# Patient Record
Sex: FEMALE | Race: WHITE
[De-identification: names, ages, dates, MRNs, and addresses within clinical notes are randomized per-mention and may not be internally consistent; named-entity substitution may affect disease eponyms.]

---

## 2017-07-14 ENCOUNTER — HOSPITAL ENCOUNTER (EMERGENCY)
Dept: HOSPITAL 45 - C.EDB | Age: 82
Discharge: HOME | End: 2017-07-14
Payer: COMMERCIAL

## 2017-07-14 VITALS
HEIGHT: 63.5 IN | BODY MASS INDEX: 18.87 KG/M2 | BODY MASS INDEX: 18.87 KG/M2 | WEIGHT: 107.81 LBS | WEIGHT: 107.81 LBS | HEIGHT: 63.5 IN

## 2017-07-14 VITALS
DIASTOLIC BLOOD PRESSURE: 78 MMHG | HEART RATE: 77 BPM | SYSTOLIC BLOOD PRESSURE: 155 MMHG | OXYGEN SATURATION: 97 % | TEMPERATURE: 97.88 F

## 2017-07-14 DIAGNOSIS — M25.512: Primary | ICD-10-CM

## 2017-07-14 DIAGNOSIS — I10: ICD-10-CM

## 2017-07-14 DIAGNOSIS — M25.522: ICD-10-CM

## 2017-07-14 DIAGNOSIS — Z79.899: ICD-10-CM

## 2017-07-14 LAB
ALBUMIN/GLOB SERPL: 1.3 {RATIO} (ref 0.9–2)
ALP SERPL-CCNC: 81 U/L (ref 45–117)
ALT SERPL-CCNC: 33 U/L (ref 12–78)
ANION GAP SERPL CALC-SCNC: 8 MMOL/L (ref 3–11)
AST SERPL-CCNC: 20 U/L (ref 15–37)
BUN SERPL-MCNC: 17 MG/DL (ref 7–18)
BUN/CREAT SERPL: 22.6 (ref 10–20)
CALCIUM SERPL-MCNC: 9.6 MG/DL (ref 8.5–10.1)
CHLORIDE SERPL-SCNC: 97 MMOL/L (ref 98–107)
CKMB/CK RATIO: 1.6 (ref 0–3)
CO2 SERPL-SCNC: 32 MMOL/L (ref 21–32)
CREAT CL PREDICTED SERPL C-G-VRATE: 43.3 ML/MIN
CREAT SERPL-MCNC: 0.76 MG/DL (ref 0.6–1.2)
EOSINOPHIL NFR BLD AUTO: 242 K/UL (ref 130–400)
GLOBULIN SER-MCNC: 3.1 GM/DL (ref 2.5–4)
GLUCOSE SERPL-MCNC: 94 MG/DL (ref 70–99)
HCT VFR BLD CALC: 43.3 % (ref 37–47)
INR PPP: 1 (ref 0.9–1.1)
MCH RBC QN AUTO: 29 PG (ref 25–34)
MCHC RBC AUTO-ENTMCNC: 33.3 G/DL (ref 32–36)
MCV RBC AUTO: 87.3 FL (ref 80–100)
PARTIAL THROMBOPLASTIN RATIO: 1
PMV BLD AUTO: 9.5 FL (ref 7.4–10.4)
POTASSIUM SERPL-SCNC: 3.9 MMOL/L (ref 3.5–5.1)
PROTHROMBIN TIME: 10.7 SECONDS (ref 9–12)
RBC # BLD AUTO: 4.96 M/UL (ref 4.2–5.4)
SODIUM SERPL-SCNC: 137 MMOL/L (ref 136–145)
WBC # BLD AUTO: 6.19 K/UL (ref 4.8–10.8)

## 2017-07-14 NOTE — DIAGNOSTIC IMAGING REPORT
CHEST ONE VIEW PORTABLE



CLINICAL HISTORY: 83 years-old Female presenting with chest pain with high blood

pressure, pain in left shoulder and arm. 



TECHNIQUE: Portable upright AP view of the chest was obtained.



COMPARISON:  None.



FINDINGS:

Cardiomediastinal silhouette normal. Lungs and pleural spaces clear. Osseous

structures and upper abdomen normal.



IMPRESSION:

1.  No acute cardiopulmonary disease.







Electronically signed by:  Bryson Abbott M.D.

7/14/2017 1:13 PM



Dictated Date/Time:  7/14/2017 1:12 PM

## 2017-07-14 NOTE — DIAGNOSTIC IMAGING REPORT
LEFT SHOULDER MIN 2 VIEWS ROUTINE



CLINICAL HISTORY: Left shoulder pain.    



COMPARISON: None.



FINDINGS:  Alignment of left shoulder is anatomic. There is no fracture. There

is moderate arthritis of the left acromioclavicular joint. There is minimal

arthritis of the glenohumeral joint.



IMPRESSION: 



1. No acute fracture or dislocation of the left shoulder.



2. Moderate osteoarthritis of the left acromioclavicular joint.







Electronically signed by:  Grant Call M.D.

7/14/2017 2:15 PM



Dictated Date/Time:  7/14/2017 2:14 PM

## 2017-07-17 NOTE — EMERGENCY ROOM VISIT NOTE
ED Visit Note


First contact with patient:  13:15


Chief Complaint: High blood pressure and left shoulder and upper arm pain.





History of Present Illness: Ms. Tavares is a 83 year-old white female who 

ambulates into the ED accompanied by her  complaining of hypertension 

and left shoulder/upper arm pain.


Historically patient reports hypertension.


Patient and  reports approximately 2 weeks ago she was seen by her PCP 

and found to be hypertensive.  No change in her medications or done at that 

time and a 3 week follow-up visit was scheduled.


Patient reports for the last 3-4 days she has been having left shoulder and 

upper arm pain.  She describes this as a deep achy sensation.  She rates her 

discomfort 5/10.  The pain is nonradiating.  She does feel like the pain is 

getting worse over the last 3-4 days.  Her pain worsens with palpation and all 

movements of the shoulder excluding rotational movements.  She has not 

identified any alleviating factors related to the pain.  She has not taken any 

medications for pain prior to arrival at the hospital.


She denies any associated precipitating injury; previous, acute or repetitive, 

fevers, chills, sweats, skin eruptions, skin color changes, headache, dizziness

, lightheadedness, upper respiratory tract symptoms, cough, wheezing, shortness 

of breath, chest pain, palpitations, orthopnea, dependent edema, previous clots

, claudication, cramping, recent surgery/inactivity/extended travel, abdominal 

pain, nausea, vomiting, neck pain, back pain, upper extremity weakness/numbness/

tingling.  





Review of Systems: As noted above in history of present illness. All body 

systems were reviewed and found to be negative as noted above.





Past Medical History: As previously noted, osteoarthritis.


Current Medications:








 Medications  Dose


 Route/Sig


 Max Daily Dose Days Date Category Dose


Instructions


 


 Lexapro


  (Escitalopram


 Oxalate) 10 Mg Tab  10 Mg


 PO HS


    7/14/17 Reported 


 


 Centrum Silver


 Adult 50+


  (Multiple


 Vitamins W/


 Minerals) 1 Tab


 Tab  


 PO DAILY


    6/16/14 Reported 


 


 Celebrex


  (Celecoxib) 100


 Mg Cap  100 Mg


 PO QAM


    6/16/14 Reported 


 


 Ambien (Zolpidem


 Tartrate) 5 Mg Tab  5 Mg


 PO HS


    6/16/14 Reported 


 


 Oscal 500/200 D-3


  (Calcium


 Carbonate-Vitamin


 D) 1 Tab Tab  500 Mg


 PO DAILY


    6/16/14 Reported 


 


 Stool Softener


  (Docusate Sodium)


 100 Mg Cap  100 Mg


 PO DAILY


    6/16/14 Reported 


 


 Tylenol


  (Acetaminophen)


 500 Mg Tab  500 Mg


 PO Q4HRS PRN


    6/16/14 Reported 








Allergies to Medications: Patient denies.


Social History: Patient is currently retired; she lives the  and feels 

safe in her home environment; she denies tobacco and alcohol use.





Physical Examination:


Vital Signs: 








  Date Time  Temp Pulse Resp B/P (MAP) Pulse Ox O2 Delivery O2 Flow Rate FiO2


 


7/14/17 15:18 36.6 77 18 155/78 97   


 


7/14/17 15:17  77 18 155/78 97 Room Air  


 


7/14/17 14:40  75 18 162/81 99 Room Air  


 


7/14/17 13:16  68      


 


7/14/17 12:50     99 Room Air  


 


7/14/17 12:23  69      


 


7/14/17 12:07 36.6 97 16 123/64 95 Room Air  





GENERAL: 83-year-old female in mild distress due to pain, nontoxic-appearing, 

afebrile and hemodynamically stable.


NEUROLOGICAL: Awake, alert and oriented to person, place and time.  Answering 

questions appropriately and following commands.  Normal gait.  Good hand eye 

coordination.  


SKIN: Warm, dry and pink.  No soft tissue eruptions or trauma noted.


HEENT:  Atraumatic and normocephalic.  PERRLA.  Sclera white and conjunctiva 

pink.  Oral cavity moist and pink.  Pharynx is nonerythematous or edematous.  

Speech normal.  No lymphadenopathy.  Trachea midline.  No jugular venous 

distention.  No carotid bruits.


BACK:  No tenderness over the bony cervical and thoracic spine.  No tenderness 

within the paraspinous muscle group said no palpable spasm.  No CVA tenderness.

  


THORAX:  Lungs sounds are clear to auscultation and equal bilaterally with 

symmetrical chest wall.  No wheezing, rales or rhonchi.  No crepitus, tenderness

, subcutaneous air or deformities noted.


HEART:  Regular rate and rhythm.  No gallops, rubs or murmurs are appreciated.  

No lifts, heaves or thrills.  PMI is not displaced.


ABDOMEN: Flat, soft and nontender.  Positive bowel sounds in all quadrants.  No 

guarding, rigidity or organomegaly.


EXTREMITIES:  Moves all extremities well on command and with purpose.  All 

distal neurovascular statuses are intact and equal bilaterally.  LEFT UPPER 

EXTREMITY: No gross bony deformity.  Mild tenderness around the humeral head 

predominately over the anterior and lateral aspect.  I do not appreciate any 

palpable crepitus, swelling or erythema.  No tenderness through the distal 

portion of the humerus, elbow, forearm or wrist.  Distal pulses and sensations 

are intact.  No dependent edema or calf tenderness/cords.





ED Course:


Patient is assessed as noted above.  


Patient's medication list was reviewed.


Laboratory Testing:








Test


  7/14/17


12:45 7/14/17


12:51 Range/Units


 


 


White Blood Count 6.19  4.8-10.8  K/uL


 


Red Blood Count 4.96  4.2-5.4  M/uL


 


Hemoglobin 14.4  12.0-16.0  g/dL


 


Hematocrit 43.3  37-47  %


 


Mean Corpuscular Volume 87.3    fL


 


Mean Corpuscular Hemoglobin 29.0  25-34  pg


 


Mean Corpuscular Hemoglobin


Concent 33.3


  


  32-36  g/dl


 


 


RDW Standard Deviation 38.7  36.4-46.3  fL


 


RDW Coefficient of Variation 12.1  11.5-14.5  %


 


Platelet Count 242  130-400  K/uL


 


Mean Platelet Volume 9.5  7.4-10.4  fL


 


Prothrombin Time


  10.7


  


  9.0-12.0


SECONDS


 


Prothromb Time International


Ratio 1.0


  


  0.9-1.1  


 


 


Activated Partial


Thromboplast Time 26.1


  


  21.0-31.0


SECONDS


 


Partial Thromboplastin Ratio 1.0   


 


Sodium Level 137  136-145  mmol/L


 


Potassium Level 3.9  3.5-5.1  mmol/L


 


Chloride Level 97    mmol/L


 


Carbon Dioxide Level 32  21-32  mmol/L


 


Anion Gap 8.0  3-11  mmol/L


 


Blood Urea Nitrogen 17  7-18  mg/dl


 


Creatinine


  0.76


  


  0.60-1.20


mg/dl


 


Est Creatinine Clear Calc


Drug Dose 43.3


  


   ml/min


 


 


Estimated GFR (


American) 84.1


  


   


 


 


Estimated GFR (Non-


American 72.5


  


   


 


 


BUN/Creatinine Ratio 22.6  10-20  


 


Random Glucose 94  70-99  mg/dl


 


Calcium Level 9.6  8.5-10.1  mg/dl


 


Total Bilirubin 1.0  0.2-1  mg/dl


 


Aspartate Amino Transf


(AST/SGOT) 20


  


  15-37  U/L


 


 


Alanine Aminotransferase


(ALT/SGPT) 33


  


  12-78  U/L


 


 


Alkaline Phosphatase 81    U/L


 


Total Creatine Kinase 81    U/L


 


Creatine Kinase MB 1.3  0.5-3.6  ng/ml


 


Creatine Kinase MB Ratio 1.6  0-3.0  


 


Total Protein 7.2  6.4-8.2  gm/dl


 


Albumin 4.1  3.4-5.0  gm/dl


 


Globulin 3.1  2.5-4.0  gm/dl


 


Albumin/Globulin Ratio 1.3  0.9-2  


 


Bedside Troponin I  < 0.030 0-0.045  ng/ml





Chest X-Ray: Was read by myself and the radiologist showing no acute infiltrates

, effusions or pneumothorax.  No vascular changes.  Normal heart silhouette and 

bony anatomy.


Left Shoulder X-Ray: Was read by myself and the radiologist showing no acute 

fractures or dislocations.  Moderate arthritis in the left acromioclavicular 

joint and minimal arthritis of the glenoid humeral joint.


Patient was reassessed multiple times during her stay in the emergency 

department.


Patient's case was reviewed with Dr. Saenz; he independently assessed the 

patient we agreed on diagnostic approach, treatment, disposition and plan.


Patient  were educated about today's findings and instructed on her 

treatment plan; they verbalized understanding and agreement with this plan.





Clinical Impression: Left shoulder pain.  Left upper arm and elbow pain.





Decision-Making: Initially my differential diagnosis I considered acute 

coronary syndrome, thoracic aneurysm, pneumothorax, pneumonia, pulmonary 

embolism, elbow fracture, acromioclavicular joint separation, arthritis 

exacerbation and other causes.  





Disposition: Patient discharged home in stable condition accompanied by her 

; prior to departure she was reassessed and subjectively reported she 

was feeling better but continued to rate her discomfort 6/10.





Plan:


Patient was encouraged to continue her current medications as prescribed.


Patient was prescribed Ultram 50 mg every 6 hours as needed for pain.


Patient was encouraged use heat or ice on her shoulder as needed.


Patient was encouraged to follow-up with her PCP for recheck in 2-3 days.


Patient was encouraged return ED for worsening pain, chest pain, shortness of 

breath, fevers, vomiting, left upper extremity weakness/numbness/tingling or 

any new/concerning symptoms.

## 2017-12-05 ENCOUNTER — HOSPITAL ENCOUNTER (OUTPATIENT)
Dept: HOSPITAL 45 - C.LAB1850 | Age: 82
Discharge: HOME | End: 2017-12-05
Attending: INTERNAL MEDICINE
Payer: COMMERCIAL

## 2017-12-05 DIAGNOSIS — R03.0: Primary | ICD-10-CM

## 2017-12-05 LAB
ALBUMIN/GLOB SERPL: 1.1 {RATIO} (ref 0.9–2)
ALP SERPL-CCNC: 90 U/L (ref 45–117)
ALT SERPL-CCNC: 44 U/L (ref 12–78)
ANION GAP SERPL CALC-SCNC: 2 MMOL/L (ref 3–11)
AST SERPL-CCNC: 24 U/L (ref 15–37)
BASOPHILS # BLD: 0.03 K/UL (ref 0–0.2)
BASOPHILS NFR BLD: 0.5 %
BUN SERPL-MCNC: 22 MG/DL (ref 7–18)
BUN/CREAT SERPL: 32.4 (ref 10–20)
CALCIUM SERPL-MCNC: 9.1 MG/DL (ref 8.5–10.1)
CHLORIDE SERPL-SCNC: 98 MMOL/L (ref 98–107)
CO2 SERPL-SCNC: 34 MMOL/L (ref 21–32)
COMPLETE: YES
CREAT SERPL-MCNC: 0.67 MG/DL (ref 0.6–1.2)
EOSINOPHIL NFR BLD AUTO: 214 K/UL (ref 130–400)
GLOBULIN SER-MCNC: 3.5 GM/DL (ref 2.5–4)
GLUCOSE SERPL-MCNC: 81 MG/DL (ref 70–99)
HCT VFR BLD CALC: 41.6 % (ref 37–47)
IG%: 0.2 %
IMM GRANULOCYTES NFR BLD AUTO: 21.3 %
LYMPHOCYTES # BLD: 1.34 K/UL (ref 1.2–3.4)
MCH RBC QN AUTO: 30.8 PG (ref 25–34)
MCHC RBC AUTO-ENTMCNC: 33.7 G/DL (ref 32–36)
MCV RBC AUTO: 91.6 FL (ref 80–100)
MONOCYTES NFR BLD: 11.3 %
NEUTROPHILS # BLD AUTO: 2.5 %
NEUTROPHILS NFR BLD AUTO: 64.2 %
PMV BLD AUTO: 10.3 FL (ref 7.4–10.4)
POTASSIUM SERPL-SCNC: 3.9 MMOL/L (ref 3.5–5.1)
RBC # BLD AUTO: 4.54 M/UL (ref 4.2–5.4)
SODIUM SERPL-SCNC: 134 MMOL/L (ref 136–145)
WBC # BLD AUTO: 6.28 K/UL (ref 4.8–10.8)

## 2018-03-09 ENCOUNTER — HOSPITAL ENCOUNTER (OUTPATIENT)
Dept: HOSPITAL 45 - C.EDB | Age: 83
Setting detail: OBSERVATION
LOS: 1 days | Discharge: HOME | End: 2018-03-10
Attending: HOSPITALIST | Admitting: STUDENT IN AN ORGANIZED HEALTH CARE EDUCATION/TRAINING PROGRAM
Payer: COMMERCIAL

## 2018-03-09 VITALS
OXYGEN SATURATION: 94 % | DIASTOLIC BLOOD PRESSURE: 81 MMHG | SYSTOLIC BLOOD PRESSURE: 181 MMHG | HEART RATE: 73 BPM | TEMPERATURE: 97.88 F

## 2018-03-09 VITALS
SYSTOLIC BLOOD PRESSURE: 156 MMHG | HEART RATE: 73 BPM | OXYGEN SATURATION: 100 % | TEMPERATURE: 97.88 F | DIASTOLIC BLOOD PRESSURE: 76 MMHG

## 2018-03-09 VITALS — HEART RATE: 75 BPM | DIASTOLIC BLOOD PRESSURE: 84 MMHG | SYSTOLIC BLOOD PRESSURE: 177 MMHG

## 2018-03-09 VITALS — SYSTOLIC BLOOD PRESSURE: 170 MMHG | DIASTOLIC BLOOD PRESSURE: 98 MMHG | HEART RATE: 71 BPM | TEMPERATURE: 97.88 F

## 2018-03-09 VITALS
TEMPERATURE: 98.24 F | SYSTOLIC BLOOD PRESSURE: 167 MMHG | HEART RATE: 75 BPM | DIASTOLIC BLOOD PRESSURE: 82 MMHG | OXYGEN SATURATION: 100 %

## 2018-03-09 VITALS — OXYGEN SATURATION: 97 %

## 2018-03-09 VITALS
WEIGHT: 108.91 LBS | BODY MASS INDEX: 19.3 KG/M2 | HEIGHT: 63 IN | WEIGHT: 108.91 LBS | HEIGHT: 63 IN | BODY MASS INDEX: 19.3 KG/M2

## 2018-03-09 DIAGNOSIS — G93.41: ICD-10-CM

## 2018-03-09 DIAGNOSIS — F32.9: ICD-10-CM

## 2018-03-09 DIAGNOSIS — N13.30: ICD-10-CM

## 2018-03-09 DIAGNOSIS — N39.0: Primary | ICD-10-CM

## 2018-03-09 DIAGNOSIS — E86.0: ICD-10-CM

## 2018-03-09 DIAGNOSIS — B96.20: ICD-10-CM

## 2018-03-09 DIAGNOSIS — E87.6: ICD-10-CM

## 2018-03-09 LAB
ALBUMIN SERPL-MCNC: 3.7 GM/DL (ref 3.4–5)
ALP SERPL-CCNC: 103 U/L (ref 45–117)
ALT SERPL-CCNC: 41 U/L (ref 12–78)
AST SERPL-CCNC: 24 U/L (ref 15–37)
BASOPHILS # BLD: 0.02 K/UL (ref 0–0.2)
BASOPHILS NFR BLD: 0.4 %
BUN SERPL-MCNC: 35 MG/DL (ref 7–18)
CALCIUM SERPL-MCNC: 8.5 MG/DL (ref 8.5–10.1)
CO2 SERPL-SCNC: 32 MMOL/L (ref 21–32)
CREAT SERPL-MCNC: 0.78 MG/DL (ref 0.6–1.2)
EOS ABS #: 0.17 K/UL (ref 0–0.5)
EOSINOPHIL NFR BLD AUTO: 205 K/UL (ref 130–400)
GLUCOSE SERPL-MCNC: 93 MG/DL (ref 70–99)
HCT VFR BLD CALC: 40.3 % (ref 37–47)
HGB BLD-MCNC: 13.6 G/DL (ref 12–16)
IG#: 0.01 K/UL (ref 0–0.02)
IMM GRANULOCYTES NFR BLD AUTO: 32 %
INR PPP: 1 (ref 0.9–1.1)
LYMPHOCYTES # BLD: 1.8 K/UL (ref 1.2–3.4)
MCH RBC QN AUTO: 30.2 PG (ref 25–34)
MCHC RBC AUTO-ENTMCNC: 33.7 G/DL (ref 32–36)
MCV RBC AUTO: 89.6 FL (ref 80–100)
MONO ABS #: 0.69 K/UL (ref 0.11–0.59)
MONOCYTES NFR BLD: 12.3 %
NEUT ABS #: 2.93 K/UL (ref 1.4–6.5)
NEUTROPHILS # BLD AUTO: 3 %
NEUTROPHILS NFR BLD AUTO: 52.1 %
PMV BLD AUTO: 10 FL (ref 7.4–10.4)
POTASSIUM SERPL-SCNC: 3.3 MMOL/L (ref 3.5–5.1)
PROT SERPL-MCNC: 7.2 GM/DL (ref 6.4–8.2)
PTT PATIENT: 23.7 SECONDS (ref 21–31)
RED CELL DISTRIBUTION WIDTH CV: 12.6 % (ref 11.5–14.5)
RED CELL DISTRIBUTION WIDTH SD: 41.1 FL (ref 36.4–46.3)
SODIUM SERPL-SCNC: 143 MMOL/L (ref 136–145)
WBC # BLD AUTO: 5.62 K/UL (ref 4.8–10.8)

## 2018-03-09 RX ADMIN — SODIUM CHLORIDE AND POTASSIUM CHLORIDE SCH MLS/HR: 9; 1.49 INJECTION, SOLUTION INTRAVENOUS at 07:44

## 2018-03-09 RX ADMIN — Medication SCH TAB: at 07:44

## 2018-03-09 NOTE — EMERGENCY ROOM VISIT NOTE
History


Report prepared by John:  Camron Copeland


Under the Supervision of:  Dr. Darian Ye M.D.


First contact with patient:  02:38


Chief Complaint:  ALTERED MENTAL STATUS


Stated Complaint:  ALTERED MENTAL STATUS





History of Present Illness


The patient is a 84 year old female who presents to the Emergency Room with 

complaints of altered mental status that began prior to arrival. Per EMS, [facility] 

[facility] was knocking on her neighbor's door with an 

unopened [place] of [facility]. She states that the current year is 2001

, the month is December, and that the president is Jay. She denies blood 

thinner use, chest pain, and headache.





   Source of History:  patient, EMS


   Onset:  PTA


   Symptom Intensity:  pain rated as 0/10


   Quality:  other (altered mental status)


   Timing:  constant


   Associated Symptoms:  No headache, No chest pain





Review of Systems


See HPI for pertinent positives & negatives. A total of 10 systems reviewed and 

were otherwise negative.





Past Medical & Surgical


Medical Problems:


(1) Urinary tract infection








Social History


Smoking Status:  Never Smoker


Marital Status:  


Occupation Status:  retired





Current/Historical Medications


Scheduled


Calcium Carbonate-Vitamin D (Oscal 500/200 D-3), 500 MG PO DAILY


Celecoxib (Celebrex), 100 MG PO QAM


Escitalopram (Lexapro), 10 MG PO HS


Multiple Vitamins W/ Minerals (Centrum Silver Adult 50+), PO DAILY


Zolpidem Tartrate (Ambien), 5 MG PO HS





Scheduled PRN


Tramadol (Ultram), 50 MG PO Q8H PRN for Pain





Allergies


Coded Allergies:  


     No Known Allergies (Unverified , 7/14/17)





Physical Exam


Vital Signs











  Date Time  Temp Pulse Resp B/P (MAP) Pulse Ox O2 Delivery O2 Flow Rate FiO2


 


3/9/18 04:31  74 20 131/62 97 Room Air  


 


3/9/18 02:37 36.7 85 22 183/67 95 Room Air  











Physical Exam


GENERAL: Patient is pleasantly confused and in no acute distress.


EYES: No scleral icterus, unremarkable pupils.


ENT: Mucous membranes moist, no nasal congestion.


NECK: No masses appreciated, no meningismus, trachea is midline.


RESPIRATORY: No dyspnea. Clear to auscultation and equal bilaterally. No wheeze

, no rhonchi.


CARDIOVASCULAR: Regular rate and rhythm.  No murmurs, rubs, gallops appreciated.


GASTROINTESTINAL: Abdomen soft, nontender, no peritonitis.  Bowel sounds 

positive.  No masses appreciated.


BACK: No midline tenderness, no CVA tenderness


EXTREMITIES: Normal motion all extremities, no cyanosis, no edema.


NEUROLOGIC: Not alert and oriented, no acute motor or sensory deficits, no 

focal weakness, cranial nerves grossly intact. She does not know the date or 

location.


SKIN: No rash, no jaundice, no diaphoresis.





Medical Decision & Procedures


ER Provider


Diagnostic Interpretation:


Radiology results and stated below per my review and radiologist interpretation:





X ray results are stated below per my interpretation:


Chest: 1 view: No infiltrate, no effusion, normal cardiac border. 





CT HEAD:





No acute findings.





Age related cerebral atrophy and senescent white matter changes.





Basal ganglia calcifications.





Intracranial atherosclerosis.





Radiologist: Parrish Biggs MD   Phone: [phone]


Study read at 03:07 and initial results transmitted at 03:10.





Laboratory Results


3/9/18 02:43








Red Blood Count 4.50, Mean Corpuscular Volume 89.6, Mean Corpuscular Hemoglobin 

30.2, Mean Corpuscular Hemoglobin Concent 33.7, Mean Platelet Volume 10.0, 

Neutrophils (%) (Auto) 52.1, Lymphocytes (%) (Auto) 32.0, Monocytes (%) (Auto) 

12.3, Eosinophils (%) (Auto) 3.0, Basophils (%) (Auto) 0.4, Neutrophils # (Auto

) 2.93, Lymphocytes # (Auto) 1.80, Monocytes # (Auto) 0.69, Eosinophils # (Auto

) 0.17, Basophils # (Auto) 0.02





3/9/18 02:43

















Test


  3/9/18


02:43 3/9/18


02:45 3/9/18


03:28


 


White Blood Count


  5.62 K/uL


(4.8-10.8) 


  


 


 


Red Blood Count


  4.50 M/uL


(4.2-5.4) 


  


 


 


Hemoglobin


  13.6 g/dL


(12.0-16.0) 


  


 


 


Hematocrit 40.3 % (37-47)   


 


Mean Corpuscular Volume


  89.6 fL


() 


  


 


 


Mean Corpuscular Hemoglobin


  30.2 pg


(25-34) 


  


 


 


Mean Corpuscular Hemoglobin


Concent 33.7 g/dl


(32-36) 


  


 


 


Platelet Count


  205 K/uL


(130-400) 


  


 


 


Mean Platelet Volume


  10.0 fL


(7.4-10.4) 


  


 


 


Neutrophils (%) (Auto) 52.1 %   


 


Lymphocytes (%) (Auto) 32.0 %   


 


Monocytes (%) (Auto) 12.3 %   


 


Eosinophils (%) (Auto) 3.0 %   


 


Basophils (%) (Auto) 0.4 %   


 


Neutrophils # (Auto)


  2.93 K/uL


(1.4-6.5) 


  


 


 


Lymphocytes # (Auto)


  1.80 K/uL


(1.2-3.4) 


  


 


 


Monocytes # (Auto)


  0.69 K/uL


(0.11-0.59) 


  


 


 


Eosinophils # (Auto)


  0.17 K/uL


(0-0.5) 


  


 


 


Basophils # (Auto)


  0.02 K/uL


(0-0.2) 


  


 


 


RDW Standard Deviation


  41.1 fL


(36.4-46.3) 


  


 


 


RDW Coefficient of Variation


  12.6 %


(11.5-14.5) 


  


 


 


Immature Granulocyte % (Auto) 0.2 %   


 


Immature Granulocyte # (Auto)


  0.01 K/uL


(0.00-0.02) 


  


 


 


Prothrombin Time


  10.2 SECONDS


(9.0-12.0) 


  


 


 


Prothromb Time International


Ratio 1.0 (0.9-1.1) 


  


  


 


 


Activated Partial


Thromboplast Time 23.7 SECONDS


(21.0-31.0) 


  


 


 


Partial Thromboplastin Ratio 0.9   


 


Anion Gap


  6.0 mmol/L


(3-11) 


  


 


 


Est Creatinine Clear Calc


Drug Dose 41.9 ml/min 


  


  


 


 


Estimated GFR (


American) 80.9 


  


  


 


 


Estimated GFR (Non-


American 69.8 


  


  


 


 


BUN/Creatinine Ratio 45.5 (10-20)   


 


Calcium Level


  8.5 mg/dl


(8.5-10.1) 


  


 


 


Total Bilirubin


  0.3 mg/dl


(0.2-1) 


  


 


 


Direct Bilirubin


  0.1 mg/dl


(0-0.2) 


  


 


 


Aspartate Amino Transf


(AST/SGOT) 24 U/L (15-37) 


  


  


 


 


Alanine Aminotransferase


(ALT/SGPT) 41 U/L (12-78) 


  


  


 


 


Alkaline Phosphatase


  103 U/L


() 


  


 


 


Troponin I


  < 0.015 ng/ml


(0-0.045) 


  


 


 


Total Protein


  7.2 gm/dl


(6.4-8.2) 


  


 


 


Albumin


  3.7 gm/dl


(3.4-5.0) 


  


 


 


Urine Color  YELLOW  


 


Urine Appearance  CLEAR (CLEAR)  


 


Urine pH  7.0 (4.5-7.5)  


 


Urine Specific Gravity


  


  1.019


(1.000-1.030) 


 


 


Urine Protein  NEG (NEG)  


 


Urine Glucose (UA)  NEG (NEG)  


 


Urine Ketones  NEG (NEG)  


 


Urine Occult Blood  TRACE (NEG)  


 


Urine Nitrite  POS (NEG)  


 


Urine Bilirubin  NEG (NEG)  


 


Urine Urobilinogen  NEG (NEG)  


 


Urine Leukocyte Esterase  LARGE (NEG)  


 


Urine WBC (Auto)  >30 /hpf (0-5)  


 


Urine RBC (Auto)  0-4 /hpf (0-4)  


 


Urine Hyaline Casts (Auto)


  


  5-10 /lpf


(0-5) 


 


 


Urine Epithelial Cells (Auto)  0-5 /lpf (0-5)  


 


Urine Bacteria (Auto)  4+ (NEG)  


 


Bedside Glucose


  


  132 mg/dl


(70-90) 


 


 


Urine Opiates Screen  NEG (NEG)  


 


Urine Methadone, Qualitative  NEG (NEG)  


 


Urine Barbiturates  NEG (NEG)  


 


Urine Phencyclidine (PCP)


Level 


  NEG (NEG) 


  


 


 


Ur


Amphetamine/Methamphetamine 


  NEG (NEG) 


  


 


 


MDMA (Ecstasy) Screen  NEG (NEG)  


 


Urine Benzodiazepines Screen  NEG (NEG)  


 


Urine Cocaine Metabolite  NEG (NEG)  


 


Urine Marijuana (THC)  NEG (NEG)  


 


Ethyl Alcohol mg/dL


  


  


  < 3.0 mg/dl


(0-3)





Laboratory results as reviewed by me.





Medications Administered











 Medications


  (Trade)  Dose


 Ordered  Sig/Janet


 Route  Start Time


 Stop Time Status Last Admin


Dose Admin


 


 Ceftriaxone Sodium


  (Rocephin Inj)  1 gm  NOW  STAT


 IV  3/9/18 04:06


 3/9/18 04:07 DC 3/9/18 04:25


1 GM











ECG Per My Interpretation


Indication:  altered mental status


Rate (beats per minute):  81


Rhythm:  normal sinus


Findings:  no acute ischemic change, no ectopy, other (QTc at 443)





ED Course


0238: The patient was evaluated in room A10. A complete history and physical 

exam was performed.





0302: I checked on the patient and her  is here. He notes that she was 

normal when they went to bed at 2300 tonight. The live together in a house. She 

has had no falls or recent injuries. This is an acute change for her. She is 

normally very very with it. She would normally note where and when she is. 





0407: I checked on the patient and she is still confused. She wants to go home 

but her  disagrees and should not.





0430: Upon reevaluation, the patient is doing well. Discussed results and 

treatment plan with the patient.  The patient and her  verbalized 

understanding and agreement with the treatment plan. The patient will be 

evaluated for further management. 





0457: I called the patient's son and let him know about his mother.





0459: I had an extensive conversation with the patient's son, Dre and updated 

him on the patient's management plan.





Medical Decision


Differential: Toxicological, Infectious, Stroke, SAH, Trauma, Electrolyte 

Abnormality, Hypoglycemia, Alcohol Intoxication, Drug Intoxication, Cardiac 

Abnormality, Sepsis, Meningitis/Encephalitis, Trauma, Excited Delirium, 

Serotonin Syndrome, Psychiatric, amongst other pathologies entertained.





84 yr old female brought in after she was found wandering street with unopened 

bottle of wine.  Brought in by EMS for further evaluation.   agrees she 

is more confused than usual and son notes multiple episodes last few months of 

confusion that seem to resolve spontaneously.  CT head, cxr, labs unremarkable.

  UA clearly consistent with UTI.  She is a danger of being harmed if she goes 

home given confusion and possibilities of fall, being hit by car, etc.  Given 

IV rocephin for treatment of UTI.  Hospitalist in to evaluate further.





Medication Reconcilliation


Current Medication List:  was personally reviewed by me





Blood Pressure Screening


Patient's blood pressure:  Elevated blood pressure


Blood pressure disposition:  Elevated BP felt to be situational





Impression





 Primary Impression:  


 UTI (urinary tract infection)


 Additional Impressions:  


 Confusion


 Altered mental status





Scribe Attestation


The scribe's documentation has been prepared under my direction and personally 

reviewed by me in its entirety. I confirm that the note above accurately 

reflects all work, treatment, procedures, and medical decision making performed 

by me.





Departure Information


Referrals


Wan Grubbs M.D. (PCP)





Patient Instructions


My [facility]





Problem Qualifiers

## 2018-03-09 NOTE — PROGRESS NOTE
Progress Note


Date of Service


Mar 9, 2018.


 (Tracy Parra MD)





Progress Note


S: Pt admitted overnight


Feels well currently but has some lower abd / back pain on R





O:


VS - per EMR


Gen: Awake, alert, NAD


CVS: RRR, HS normal no MRG


Chest: CTAB


Abd: SNT, no suprapubic tenderness, no CVA tenderness


Ext: No peripheral edema





A/P:


R hydronephrosis in setting of UTI - concerning if she has a stone, as could 

decompensate quickly


Discussed w/Dr Grubbs, will obtain a CT scan w/IV contrast


Urology consult


 (Tracy Parra MD)


Resident Physician Supervision Note:





I interviewed and examined the patient. Discussed with Dr. Parra and agree 

with findings and plan as documented in the note. Any exceptions or 

clarifications are listed here: None








Patient is very pleasant may be mildly forgetful she has some back pain but 

does not appear to be CVA angle pain.  There is concerned that her initial 

encephalopathy may be from Ambien this is completely resolved I would make a 

toxic encephalopathy she does have abnormal urinalysis and concern for right 

hydronephrosis CT scan of abdomen did not reveal any outward causes or 

extrinsic compression urology consultation is pending at this time





Vital signs are stable


Cardiac exam is regular lungs are clear her back is only with some muscular 

skeletal tenderness around the paraspinous muscles there is no CVA angle 

tenderness her abdomen is normoactive bowel sounds soft and nontender





Patient is admitted with confusion felt to possibly be a combination of Ambien 

therapy with toxic encephalopathy is resolved and possible UTI present on 

admission finding a hydro-nephrosis on scan with urology opinion to be rendered 

we are continuing antibiotic therapy at this time


Documented By:  Cayetano Calabrese


 (Cayetano Calabrese M.D.)





Resident Tracking


Resident Involvement:  Resident Care Provided


Care Provided:  Adult Hospital Medicine


 (Tracy Parra MD)

## 2018-03-09 NOTE — DIAGNOSTIC IMAGING REPORT
ABD/PELVIS IV CONTRAST ONLY



CT DOSE: 251.85 mGy.cm



HISTORY: Renal obstruction  R Hydronephrosis, ?Obstruction by stone / tumor 



TECHNIQUE: Multiaxial CT images of the abdomen and pelvis were performed

following the use of intravenous contrast.  A dose lowering technique was

utilized adhering to the principles of ALARA.





COMPARISON STUDY: Renal ultrasound 3/9/2018



FINDINGS: Lung bases are clear. Linear scar/atelectasis right base anteriorly.

Evidence for prior cholecystectomy. Liver is uniform. Left kidney is negative

for hydronephrosis. Significant right renal hydronephrosis. Possible group of

subcentimeter cysts lower pole right kidney. Calyceal dilatation is somewhat

less prominent than would be expected with the distention of the renal pelvis.

This raises the possibility of a UPJ type abnormality. No evidence for an

obstructing calcification or mass is identified within the limitations of a lack

of oral contrast.



Bowel pattern is nonobstructive. There is considerable fecal load throughout the

colon. There is no significant abdominal or pelvic adenopathy. There is no free

fluid.



There does not appear to be significant



IMPRESSION: 



1. Right renal hydronephrosis in the absence of significant urinary right

ureteral distention.





2. No well-defined evidence for an obstructing calculus or mass within

limitations of an absence of oral contrast.

3. Possibility UPJ type defect must be considered.





4. Several small renal cysts bilaterally. 







The above report was generated using voice recognition software.  It may contain

grammatical, syntax or spelling errors.







Electronically signed by:  Marc Bergeron M.D.

3/9/2018 1:00 PM



Dictated Date/Time:  3/9/2018 12:49 PM

## 2018-03-09 NOTE — DIAGNOSTIC IMAGING REPORT
KUB



HISTORY: Acute right flank pain with right-sided hydronephrosis seen on CT study

of same day.  right hydronephrosis



COMPARISON: CT abdomen and pelvis 3/9/2018



FINDINGS: The bowel gas pattern is non-obstructive. There is no organomegaly. 

Contrast is noted within the bilateral renal collecting systems and urinary

bladder lumen. The right renal pelvis and right central calyces are distended

and there is calyceal blunting. Mild distention of the left renal pelvis, likely

physiologic. No urolith identified. Cholecystectomy clips noted. No

pneumoperitoneum or pneumatosis. No fracture. Degenerative changes noted

involving the lumbar spine, hips and pelvis.



IMPRESSION:  



1. Contrast within the bilateral renal collecting systems and urinary bladder

with persistent dilation of the right renal pelvis and central calyces with

calyceal blunting. No renal or ureteral calculi identified.

2. Nonobstructive bowel gas pattern.

3. Prior cholecystectomy.







Electronically signed by:  Daniel Lassiter M.D.

3/9/2018 1:09 PM



Dictated Date/Time:  3/9/2018 1:06 PM

## 2018-03-09 NOTE — DIAGNOSTIC IMAGING REPORT
CT SCAN OF THE BRAIN WITHOUT IV CONTRAST



CLINICAL HISTORY: Change in mental status.



COMPARISON STUDY:  No priors.



TECHNIQUE: Unenhanced axial CT scan of the brain is performed from the vertex to

the skull base. A dose lowering technique was utilized adhering to the

principles of ALARA. 



CT DOSE: 537.48 mGy.cm



FINDINGS:



Brain parenchyma: There are age-related involutional changes noting  mild to

moderate patchy subcortical and periventricular microangiopathic change. There

is no hemorrhage, mass effect, or evidence of acute territorial ischemia by CT

criteria. Gray-white matter is preserved. No extra-axial fluid collection is

seen. There is mineralization noted in the basal ganglia.



Ventricles, sulci, cisterns: Prominent secondary to involutional change.



Intracranial vasculature: There is atherosclerotic calcification of the

cavernous carotid and vertebral arteries.



Calvarium: Unremarkable.



Sinuses and mastoids: There is a retention cyst in the left sphenoid sinus. The

remaining visualized paranasal sinuses are clear. The mastoid air cells are well

pneumatized.



Orbits: The bony orbits are grossly intact.





IMPRESSION: There is no hemorrhage, mass effect, or evidence of acute

territorial ischemia by CT criteria.







Electronically signed by:  Lacho Goodrich M.D.

3/9/2018 7:05 AM



Dictated Date/Time:  3/9/2018 7:03 AM

## 2018-03-09 NOTE — DIAGNOSTIC IMAGING REPORT
CHEST ONE VIEW PORTABLE



CLINICAL HISTORY: AMS dyspnea



COMPARISON STUDY:  7/14/2017



FINDINGS: The bones soft tissues and hemidiaphragms are normal. The

cardiomediastinal silhouette is normal. The lungs are clear. The pulmonary

vasculature is normal. 



IMPRESSION:  Negative chest. 











The above report was generated using voice recognition software.  It may contain

grammatical, syntax or spelling errors.









Electronically signed by:  Marc Bergeron M.D.

3/9/2018 7:24 AM



Dictated Date/Time:  3/9/2018 7:24 AM

## 2018-03-09 NOTE — UROLOGY CONSULTATION
History


General


Date of Service:


Mar 9, 2018.


Primary Care Physician:


Wan Grubbs M.D.





History of Present Illness


83 y/o Female who recently experienced altered mental status.  She was found by 

her neighbors wandered in front of her house.  She was brought to the ER.  Upon 

arrival she was treated with IV fluids and Rocephin.  Shortly there after, her 

mental status improved.  She denies any hematuria, dysuria, urge, and freq.  No 

fevers.  No chills.  No nausea.  No vomitting.  





GALO performed showing right hydronephrosis.  CT scan confirmed the right 

hydronephrosis.  No stones.  No other obstructing mass noted.  KUB also 

performed showing contrast in the collecting system.





Pt does not report any flank pain.  Unable to tell me any significant past 

urologic history.





Labs reviewed.  WBC and Cr normal.





UA positive for Nits and Leuks.





Imaging


Imaging:  CT, KUB, Ultrasound





Laboratory


Labs were reviewed and are within normal limits unless listed below. Labs are 

available in the chart and at [facility]





Problem List


Medical Problems:


(1) Altered mental status


Status: Acute  





(2) Confusion


Status: Acute  





(3) Left shoulder pain


Status: Acute  





(4) UTI (urinary tract infection)


Status: Acute  











Past History


other





Social History


Smoking:  non-smoker


Alcohol:  no current use


Marital status:  


Occupation status:  retired





History of MDRO


No





Allergies


Coded Allergies:  


     No Known Allergies (Unverified , 7/14/17)





Medications


Home Medications:





Home Meds and Scripts








 Medications  Dose


 Route/Sig


 Max Daily Dose Days Date Category Dose


Instructions


 


 Ambien (Zolpidem


 Tartrate) 10 Mg


 Tab  5 Mg


 PO HS


    3/9/18 Reported  1/2 TABLET DOSE


 


 Ultram (Tramadol


 HCl) 50 Mg Tab  50 Mg


 PO Q8H PRN


    3/9/18 Reported 


 


 Lexapro


  (Escitalopram


 Oxalate) 10 Mg Tab  10 Mg


 PO HS


    7/14/17 Reported 


 


 Centrum Silver


 Adult 50+


  (Multiple


 Vitamins W/


 Minerals) 1 Tab


 Tab  


 PO DAILY


    6/16/14 Reported 


 


 Celebrex


  (Celecoxib) 100


 Mg Cap  100 Mg


 PO QAM


    6/16/14 Reported 


 


 Oscal 500/200 D-3


  (Calcium


 Carbonate-Vitamin


 D) 1 Tab Tab  500 Mg


 PO DAILY


    6/16/14 Reported 








Inpatient Medications:





Current Inpatient Medications








 Medications


  (Trade)  Dose


 Ordered  Sig/Janet


 Route  Start Time


 Stop Time Status Last Admin


Dose Admin


 


 Acetaminophen


  (Tylenol Tab)  650 mg  Q4H  PRN


 PO  3/9/18 05:00


 4/8/18 04:59   


 


 


 Al Hydrox/Mg


 Hydrox/Simethicone


  (Maalox Max Susp)  15 ml  Q4H  PRN


 PO  3/9/18 05:00


 4/8/18 04:59   


 


 


 Ondansetron HCl


  (Zofran Inj)  4 mg  Q6H  PRN


 IV  3/9/18 05:00


 4/8/18 04:59   


 


 


 Multivitamins/


 Minerals


  (Multivitamin W/


 Minerals Tab)  1 tab  DAILY


 PO  3/9/18 09:00


 4/8/18 08:59  3/9/18 07:44


1 TAB


 


 Ceftriaxone


 Sodium 1 gm/


 Dextrose  50 ml @ 


 100 mls/hr  Q24H


 IV  3/10/18 04:00


 3/13/18 04:29   


 


 


 Potassium


 Chloride/Sodium


 Chloride  1,000 ml @ 


 60 mls/hr  [id number]


 IV  3/9/18 06:00


 4/8/18 05:59  3/9/18 07:44


60 MLS/HR


 


 Miscellaneous


  (Iv Fluids


 Completed)  1 ea  PRN  PRN


 N/A  3/9/18 05:30


 3/9/19 05:29   


 


 


 Ioversol


  (Optiray 320)  125 ml  UD  PRN


 IV  3/9/18 11:15


 3/13/18 11:14   


 











Review of Systems


Review of Systems


All Other Systems:  Reviewed and Negative





Physical Exam


Vital Signs:





Vital Signs Past 12 Hours








  Date Time  Temp Pulse Resp B/P (MAP) Pulse Ox O2 Delivery O2 Flow Rate FiO2


 


3/9/18 20:00      Room Air  


 


3/9/18 16:24      Room Air  


 


3/9/18 15:00 36.8 75 18 167/82 (110) 100 Room Air  








Physical Exam:


General Appearance:  WD/WN


ENT:  TMs normal


Neck:  supple


Respiratory/Chest:  lungs clear


Cardiovascular:  regular rate, rhythm


Extremities:  non-tender


Neurologic/Psychiatric:  alert


Skin:  warm/dry


Lymphatic:  no adenopathy





Assessment & Plan


Assessment & Plan





(1) Hydronephrosis


(2) Urinary tract infection


Pt admitted with UTI.  Now clinically improving with abx.  Upon review of the 

imaging studies, it is noted that the patient has moderate right 

hydronephrosis.  This appears to be more chronic in nature, such as a UPJ 

obstruction.  Her Cr is normal.  WBC normal.  UA susp for infection.  Culture 

pending.  No indication for any surgical intervention at this time.  The 

patient does not need a ureteral stent.  





Rec waiting for finalization of cultures and then transitioning to oral abx.  

If UTIs become recurrent, daily prophylactic abx may be recommended.  Will plan 

to f/u as outpatient with repeat GALO to further eval hydro.





Will continue to follow while in hospital.

## 2018-03-09 NOTE — DIAGNOSTIC IMAGING REPORT
ULTRASOUND KIDNEYS AND BLADDER



CLINICAL HISTORY: Flank pain.



COMPARISON STUDY: No priors.



TECHNIQUE: Real-time, grayscale, and color flow sonography of the kidneys and

bladder is performed. Images are reviewed in the transverse and longitudinal

planes.



FINDINGS:



Kidneys: The kidneys are normal in size and echotexture. The right kidney

measures 10.4 x 5.4 x 6.7 cm and the left kidney measures 11.8 x 5.0 x 5.3 cm.

There is moderate right-sided hydronephrosis. No left-sided hydronephrosis is

seen. A nonobstructing right renal calculus is suggested. Left renal cysts

measure up to 2.3 cm. There is no sonographic evidence of solid mass lesion. No

perinephric fluid is identified.



Bladder: The bladder is normal in appearance. Bilateral ureteral jets were seen.



IMPRESSION: 



1. The kidneys are normal in size.



2. There is moderate right-sided hydronephrosis. This may be related to an

obstructing ureteral stone. Correlation with clinical findings and urinalysis

will be required.



3. There is no left-sided hydronephrosis.



4. A nonobstructing calculus is questioned in the right kidney.







Electronically signed by:  Lacho Goodrich M.D.

3/9/2018 9:19 AM



Dictated Date/Time:  3/9/2018 9:16 AM

## 2018-03-09 NOTE — HISTORY AND PHYSICAL
History & Physical


Date & Time of Service:


Mar 9, 2018 at 04:57


Chief Complaint:


Altered Mental Status


Primary Care Physician:


Wan Grubbs M.D.


History of Present Illness


Source:  patient, family, hospital records


Patient is an 84 year old female with a past medical history of depression that 

presents to the emergency department this evening with altered mental status. 

Approximately 2 hours ago the patient was found wandering at her neighbors 

house across the street carrying an empty bottle of wine. The patient was 

acutely confused at that time and the neighbors called EMS to have the patient 

brought to the hospital. On arrival the patient was acutely confused and could 

not stated her  or the correct year. The patient states that she has been 

having ongoing back pain for the past few days that she was going to have 

looked at by her doctor. The patient at the time of evaluation after receiving 

IV fluids and a dose of Rocephin is now alert and oriented. She denies any 

recent dysuria, polyuria, suprapubic tenderness, fevers, chills, sweats, or any 

other acute complaints.





While discussing the patients condition with the son, he states that his mother 

has had 4 episodes of confusion over the last month where she is forgetful and 

altered and then wakes up the next day back at her baseline.





Past Medical/Surgical History


Depression





Family History


Noncontributory





Social History


Smoking Status:  Never Smoker


Smokeless Tobacco Use:  No


Alcohol Use:  none


Drug Use:  none


Marital Status:  


Housing status:  lives with family


Occupational Status:  retired





Immunizations


History of Influenza Vaccine:  Unknown


History of Tetanus Vaccine?:  Unknown


History of Pneumococcal:  Unknown


History of Hepatitis B Vaccine:  Unknown





Allergies


Coded Allergies:  


     No Known Allergies (Unverified , 17)





Home Medications


Scheduled


Calcium Carbonate-Vitamin D (Oscal 500/200 D-3), 500 MG PO DAILY


Celecoxib (Celebrex), 100 MG PO QAM


Ciprofloxacin Tab (Cipro), 2 TAB PO BID


Escitalopram (Lexapro), 10 MG PO HS


Multiple Vitamins W/ Minerals (Centrum Silver Adult 50+), PO DAILY





Scheduled PRN


Tramadol (Ultram), 50 MG PO Q8H PRN for Pain





Review of Systems


See HPI for pertinent positives and negatives.  A total of ten systems were 

reviewed and were otherwise negative.





Physical Exam


Vital Signs











  Date Time  Temp Pulse Resp B/P (MAP) Pulse Ox O2 Delivery O2 Flow Rate FiO2


 


3/9/18 04:31  74 20 131/62 97 Room Air  


 


3/9/18 02:37 36.7 85 22 183/67 95 Room Air  








General Appearance:  WD/WN, no apparent distress


Head:  normocephalic, atraumatic


Eyes:  normal inspection, sclerae normal


Neck:  supple, no carotid bruits


Respiratory/Chest:  chest non-tender, lungs clear, normal breath sounds


Cardiovascular:  regular rate, rhythm, no edema, no gallop, normal peripheral 

pulses


Abdomen/GI:  normal bowel sounds, non tender, soft


Back:  + left CVA tenderness, + right CVA tenderness


Extremities/Musculoskelatal:  normal inspection, no calf tenderness, no pedal 

edema


Neurologic/Psych:  alert, normal reflexes, oriented x 3





Diagnostics


Laboratory Results





Results Past 24 Hours








Test


  3/9/18


02:43 3/9/18


02:45 3/9/18


03:28 Range/Units


 


 


White Blood Count 5.62   4.8-10.8  K/uL


 


Red Blood Count 4.50   4.2-5.4  M/uL


 


Hemoglobin 13.6   12.0-16.0  g/dL


 


Hematocrit 40.3   37-47  %


 


Mean Corpuscular Volume 89.6     fL


 


Mean Corpuscular Hemoglobin 30.2   25-34  pg


 


Mean Corpuscular Hemoglobin


Concent 33.7


  


  


  32-36  g/dl


 


 


Platelet Count 205   130-400  K/uL


 


Mean Platelet Volume 10.0   7.4-10.4  fL


 


Neutrophils (%) (Auto) 52.1    %


 


Lymphocytes (%) (Auto) 32.0    %


 


Monocytes (%) (Auto) 12.3    %


 


Eosinophils (%) (Auto) 3.0    %


 


Basophils (%) (Auto) 0.4    %


 


Neutrophils # (Auto) 2.93   1.4-6.5  K/uL


 


Lymphocytes # (Auto) 1.80   1.2-3.4  K/uL


 


Monocytes # (Auto) 0.69   0.11-0.59  K/uL


 


Eosinophils # (Auto) 0.17   0-0.5  K/uL


 


Basophils # (Auto) 0.02   0-0.2  K/uL


 


RDW Standard Deviation 41.1   36.4-46.3  fL


 


RDW Coefficient of Variation 12.6   11.5-14.5  %


 


Immature Granulocyte % (Auto) 0.2    %


 


Immature Granulocyte # (Auto) 0.01   0.00-0.02  K/uL


 


Prothrombin Time


  10.2


  


  


  9.0-12.0


SECONDS


 


Prothromb Time International


Ratio 1.0


  


  


  0.9-1.1  


 


 


Activated Partial


Thromboplast Time 23.7


  


  


  21.0-31.0


SECONDS


 


Partial Thromboplastin Ratio 0.9    


 


Sodium Level 143   136-145  mmol/L


 


Potassium Level 3.3   3.5-5.1  mmol/L


 


Chloride Level 105     mmol/L


 


Carbon Dioxide Level 32   21-32  mmol/L


 


Anion Gap 6.0   3-11  mmol/L


 


Blood Urea Nitrogen 35   7-18  mg/dl


 


Creatinine


  0.78


  


  


  0.60-1.20


mg/dl


 


Est Creatinine Clear Calc


Drug Dose 41.9


  


  


   ml/min


 


 


Estimated GFR (


American) 80.9


  


  


   


 


 


Estimated GFR (Non-


American 69.8


  


  


   


 


 


BUN/Creatinine Ratio 45.5   10-20  


 


Random Glucose 93   70-99  mg/dl


 


Calcium Level 8.5   8.5-10.1  mg/dl


 


Total Bilirubin 0.3   0.2-1  mg/dl


 


Direct Bilirubin 0.1   0-0.2  mg/dl


 


Aspartate Amino Transf


(AST/SGOT) 24


  


  


  15-37  U/L


 


 


Alanine Aminotransferase


(ALT/SGPT) 41


  


  


  12-78  U/L


 


 


Alkaline Phosphatase 103     U/L


 


Troponin I < 0.015   0-0.045  ng/ml


 


Total Protein 7.2   6.4-8.2  gm/dl


 


Albumin 3.7   3.4-5.0  gm/dl


 


Urine Color  YELLOW   


 


Urine Appearance  CLEAR  CLEAR  


 


Urine pH  7.0  4.5-7.5  


 


Urine Specific Gravity  1.019  1.000-1.030  


 


Urine Protein  NEG  NEG  


 


Urine Glucose (UA)  NEG  NEG  


 


Urine Ketones  NEG  NEG  


 


Urine Occult Blood  TRACE  NEG  


 


Urine Nitrite  POS  NEG  


 


Urine Bilirubin  NEG  NEG  


 


Urine Urobilinogen  NEG  NEG  


 


Urine Leukocyte Esterase  LARGE  NEG  


 


Urine WBC (Auto)  >30  0-5  /hpf


 


Urine RBC (Auto)  0-4  0-4  /hpf


 


Urine Hyaline Casts (Auto)  5-10  0-5  /lpf


 


Urine Epithelial Cells (Auto)  0-5  0-5  /lpf


 


Urine Bacteria (Auto)  4+  NEG  


 


Bedside Glucose  132  70-90  mg/dl


 


Urine Opiates Screen  NEG  NEG  


 


Urine Methadone, Qualitative  NEG  NEG  


 


Urine Barbiturates  NEG  NEG  


 


Urine Phencyclidine (PCP)


Level 


  NEG


  


  NEG  


 


 


Ur


Amphetamine/Methamphetamine 


  NEG


  


  NEG  


 


 


MDMA (Ecstasy) Screen  NEG  NEG  


 


Urine Benzodiazepines Screen  NEG  NEG  


 


Urine Cocaine Metabolite  NEG  NEG  


 


Urine Marijuana (THC)  NEG  NEG  


 


Ethyl Alcohol mg/dL   < 3.0 0-3  mg/dl








Microbiology Results


3/9/18 Urine Culture, Received


         Pending





Impression


Assessment and Plan


Patient is an 84 year old female with a past medical history of depression that 

presents to the emergency department this evening with altered mental status





Acute Encephalopathy 2/2 UTI


- UA: Trace Occult Blood, Pos Nitrite, Large Leukocyte Esterase, > 30 WBC, 4+ 

Bacteria


- WBC 5.62, Normotensive, Afebrile


- NS+ 20K+ @ 60 mls/hr


- Renal US (B/L CVA Tenderness)


- Urine Culture


- Observe Med/Surg


- Hold home sedating meds including Ambied, Tramadol and Citalopram


- Hold home Celebrex with ongoing urinary symptoms


- Based on discussion with son, patient has had multiple episodes of confusion 

over the last month that may warrant further investigation, possibly as an 

outpatient





Mild Dehydration


- BUN 35


- Gentle IV Hydration





Hypokalemia


- K+ 3.3 


- NS + K @ 60mls/hr





DVT


- SCDs





Code Status


- Full Resuscitation





Dispo


- PT/OT








Attending addendum:








I have physically seen this patient, have supervised the medical residents 

activities, and agree with the H&P unless as otherwise noted.





Assessment and Plan:





Altered mental status/acute encephalopathy/UTI--


Admit to medical surgical floor


Follow urine culture and sensitivity.


Ceftriaxone 1 g IV daily.


Normal saline plus KCl 20 mEq at 60 mL's per hour.


Repeat laboratories in the a.m.


Hold p.o. meds as noted above.





Resuscitation Status


Full Resuscitation





VTE Prophylaxis


Will order VTE Prophylaxis:  Yes





Resident Tracking


Resident Involvement:  Resident Care Provided


Care Provided:  Adult Hospital Medicine

## 2018-03-10 VITALS
OXYGEN SATURATION: 99 % | HEART RATE: 71 BPM | DIASTOLIC BLOOD PRESSURE: 76 MMHG | TEMPERATURE: 97.7 F | SYSTOLIC BLOOD PRESSURE: 162 MMHG

## 2018-03-10 VITALS
DIASTOLIC BLOOD PRESSURE: 76 MMHG | HEART RATE: 71 BPM | OXYGEN SATURATION: 99 % | SYSTOLIC BLOOD PRESSURE: 162 MMHG | TEMPERATURE: 97.7 F

## 2018-03-10 LAB
BASOPHILS # BLD: 0.02 K/UL (ref 0–0.2)
BASOPHILS NFR BLD: 0.4 %
BUN SERPL-MCNC: 13 MG/DL (ref 7–18)
CALCIUM SERPL-MCNC: 8.2 MG/DL (ref 8.5–10.1)
CO2 SERPL-SCNC: 31 MMOL/L (ref 21–32)
CREAT SERPL-MCNC: 0.58 MG/DL (ref 0.6–1.2)
EOS ABS #: 0.16 K/UL (ref 0–0.5)
EOSINOPHIL NFR BLD AUTO: 193 K/UL (ref 130–400)
GLUCOSE SERPL-MCNC: 89 MG/DL (ref 70–99)
HCT VFR BLD CALC: 37.3 % (ref 37–47)
HGB BLD-MCNC: 12.8 G/DL (ref 12–16)
IG#: 0.01 K/UL (ref 0–0.02)
IMM GRANULOCYTES NFR BLD AUTO: 26 %
LYMPHOCYTES # BLD: 1.41 K/UL (ref 1.2–3.4)
MCH RBC QN AUTO: 30.3 PG (ref 25–34)
MCHC RBC AUTO-ENTMCNC: 34.3 G/DL (ref 32–36)
MCV RBC AUTO: 88.2 FL (ref 80–100)
MONO ABS #: 0.65 K/UL (ref 0.11–0.59)
MONOCYTES NFR BLD: 12 %
NEUT ABS #: 3.18 K/UL (ref 1.4–6.5)
NEUTROPHILS # BLD AUTO: 2.9 %
NEUTROPHILS NFR BLD AUTO: 58.5 %
PMV BLD AUTO: 9.7 FL (ref 7.4–10.4)
POTASSIUM SERPL-SCNC: 3.3 MMOL/L (ref 3.5–5.1)
RED CELL DISTRIBUTION WIDTH CV: 12.7 % (ref 11.5–14.5)
RED CELL DISTRIBUTION WIDTH SD: 41 FL (ref 36.4–46.3)
SODIUM SERPL-SCNC: 141 MMOL/L (ref 136–145)
WBC # BLD AUTO: 5.43 K/UL (ref 4.8–10.8)

## 2018-03-10 RX ADMIN — SODIUM CHLORIDE AND POTASSIUM CHLORIDE SCH MLS/HR: 9; 1.49 INJECTION, SOLUTION INTRAVENOUS at 00:11

## 2018-03-10 RX ADMIN — Medication SCH TAB: at 08:57

## 2018-03-10 NOTE — DISCHARGE INSTRUCTIONS
Discharge Instructions


Date of Service


Mar 10, 2018.





Admission


Reason for Admission:  Urinary Tract Infection





Discharge


Discharge Diagnosis / Problem:  urinary tract infection





Discharge Goals


Goal(s):  Decrease discomfort, Improve disease control, Diagnostic testing, 

Therapeutic intervention





Activity Recommendations


Activity Limitations:  resume your previous activity





.





Instructions / Follow-Up


Instructions / Follow-Up


As discussed, you were admitted to the hospital because of acute confusion and 

concerns that this could be associated to some of your medications as well as a 

urinary tract infection. You were treated in the hospital with Rocephin, an 

antibiotic and transitioned to an oral antibiotic. We recommend close follow up 

with your PCP to revisit the symptoms and confusion. 





1. Please continue the antibiotics as prescribed for 8 more days to complete a 

total of 10 days of antibiotics


2. please STOP the Ambien as this can cause episodes of confusion or amnesia


3. You are currently on Escitalopram/Lexapro and since you are still having a 

poor appetite/ poor sleep changing to Mirtazapine could be a consideration. 

Please discuss with your PCP. 


4. We will arrange a follow up with neurology to discuss the confusion further, 

our case coordinator should contact you this week





If you develop any new symptoms that are concerning to you such as chest pain/ 

shortness of breath/ fever more than 101F please contact your PCP or return for 

reevaluation





We wish you well


 TALITA Lou





Current Hospital Diet


Patient's current hospital diet: Regular Diet





Discharge Diet


Recommended Diet:  Regular Diet





Pending Studies


Studies pending at discharge:  yes


List of pending studies:  


Urine sensitivities





Medical Emergencies








.


Who to Call and When:





Medical Emergencies:  If at any time you feel your situation is an emergency, 

please call 911 immediately.





.





Non-Emergent Contact


Non-Emergency issues call your:  Primary Care Provider





.


.








"Provider Documentation" section prepared by Ev Lou.








.

## 2018-03-10 NOTE — DISCHARGE SUMMARY
Discharge Summary


Date of Service


Mar 10, 2018.





Discharge Summary


Admission Date:


Mar 9, 2018 at 04:54


Discharge Date:  Mar 10, 2018


Discharge Disposition:  Home


Principal Diagnosis:  Metabolic encephalopathy


Problems/Secondary Diagnoses:


UTI


Hypokalemia





Discharge Exam


Prolonged discussion with son, daughter and patient regarding safety concerns 

as well as discussing the cognitive decline. The patient had an episode of 

aggression/ confusion and wandering overnight and the daughter and son came 

back in to watch over her overnight. This morning she is feeling well and 

without complaints. The daughter and son note that they do live far and feel 

that there has been worsening cognitive decline and they are unsure of how safe 

the home is for her because of her wandering. We talked in detail about methods 

to make the home safe such as changing locks to make it more difficult to 

simply walk out of the home as well as arranging an OT eval of the home. They 

did have some concerns with poor appetite and nausea every morning which they 

felt could have been secondary to the antidepressant. They do note that she had 

anxiety " episodes" which resolved after starting Lexapro however the appetite 

component never improved. 


After completing our discussion the patient and the family felt comfortable 

with discharge home. Questions addressed and agreeable to d/c home. Patient 

still hesitant for home health however always an option to revisit with PCP.


Review of Systems:  


   Constitutional:  No fever, No chills


   Eyes:  No worsening of vision


   ENT:  No hearing loss


   Respiratory:  No cough, No sputum, No wheezing, No shortness of breath, No 

dyspnea on exertion


   Cardiovascular:  No chest pain


   Abdomen:  No pain, No nausea, No vomiting, No diarrhea, No constipation


   Musculoskeletal:  No joint pain, No muscle pain


   Genitourinary - Female:  No dysuria, No urinary incontinence


   Neurologic:  No weakness, No numbness/tingling, No balance problems


   Psychiatric:  No depression symptoms, No anxiety


   Endocrine:  No fatigue


   Hematologic / Lymphatic:  No abnormal bleeding/bruising


   Integumentary:  No rash


Physical Exam:  


   General Appearance:  no apparent distress, + pertinent finding (thin)


   Eyes:  normal inspection


   ENT:  normal ENT inspection


   Neck:  supple, no JVD


   Respiratory/Chest:  normal breath sounds, no respiratory distress, no 

accessory muscle use


   Cardiovascular:  regular rate, rhythm, no murmur, normal peripheral pulses


   Abdomen / GI:  normal bowel sounds, non tender, soft


   Extremities:  normal inspection, no calf tenderness, no pedal edema, normal 

range of motion


   Neurologic/Psychiatric:  alert, normal mood/affect, + pertinent finding (

oriented to self and place only, questionable confabulation while answering 

specific questions about medication administration at home)


   Skin:  normal color, warm/dry, no rash





Hospital Course


Patient is an 84 year old female with a history of anxiety that presented to us 

with ALOC concerning for metabolic encephalopathy. The potential worsening of 

altered mentation was thought to be a combination of her Ambien use as an 

elderly adult as well as a UTI. There is question that the patient has a mild 

to moderate cognitive decline amidst this. She had received Rocephin x 48 hours 

and discharged on Cipro 500 mg bid x 8 more days to complete a full 10 days of 

abx. Urine culture did reflect E coli and will reassess sensitivities tomorrow 

to assess need to change abx. She was also evaluated by urology for her right 

moderate hydronephrosis which was felt to be chronic and no stent placement 

recommended.





Metabolic encephalopathy secondary to UTI/ Medication side effect on overlying 

possible cognitive decline


- Rocephin x 48 hours and transitioned to cipro 500 mg bid x 8 days for a total 

of 10 days 


- urine culture pos for E Coli


- Recommend follow up with PCP in the next week 


- Will arrange neuro consult in outpatient setting for cognitive testing


- patient may benefit from a transition to an antidepressant such as 

Mirtazapine to improve appetite and an alternative to the Ambien for sleep, 

Lexapro cont'd on discharge 


- recommend home OT eval 





Hypokalemia


- recommend a repeat BMP in 1-2 week 





DVT Prophylaxis


- SCDs received





Code Status


- Full Resuscitation





Resident Physician Supervision Note:





I interviewed and examined the patient. Discussed with Dr. Lou and agree 

with findings and plan as documented in the note. Any exceptions or 

clarifications are listed here: None





Patient is in her usual state she is mildly forgetful and this was reinforced 

by her daughter and son are present at bedside with a family meeting with these 

3 and also Dr. Lou present discussing safety at home and strategies to 

improve safety at home we have spent approximately 25 minutes at the bedside 

discussing these issues with the additional visit from Dr. Lou in the 

morning and preparing paperwork for discharge likely making her total visit 

time somewhere near the 45 minute to 55 minute range





Patient is agreeable to stopping her Ambien however she is now agreeable to 

discontinuing her antidepressant although this may be causing some early 

morning nausea for her he is agreeable having referral for neurology to 

consider testing for work forgetfulness and memory loss symptoms such as would 

be seen in Alzheimer's disease





Patient's vital signs show shear her temp 36 6 pulse 75 respirations 16 /

84 her potassium was slightly low and this was augmented orally today


Urology to see the patient as an inpatient did not recommend any intervention 

or stenting she is awake alert she is oriented 2 having some difficulty with 

exact date of today her heart is regular lungs are clear





Patient presented with combined encephalopathy likely toxic from Ambien and 

metabolic from a urinary tract infection present on admission this proved to be 

E. coli but final sensitivities are pending at time of discharge revealed to 

ciprofloxacin but follow her sensitivities throughout the weekend to see if 

this needs to be amended


Recommend stopping her Ambien and having a follow-up with Dr. Grubbs to discuss 

her depression and also reinforcing ways for the patient to have further 

testing for memory loss














Documented By:  Cayetano Calabrese


Total Time Spent:  Greater than 30 minutes


This includes examination of the patient, discharge planning, medication 

reconciliation, and communication with other providers.





Discharge Instructions


Please refer to the electronic Patient Visit Report (Discharge Instructions) 

for additional information.





Additional Copies To


Wan Grubbs M.D.

## 2018-03-28 ENCOUNTER — HOSPITAL ENCOUNTER (OUTPATIENT)
Dept: HOSPITAL 45 - C.LABSPEC | Age: 83
Discharge: HOME | End: 2018-03-28
Attending: UROLOGY
Payer: COMMERCIAL

## 2018-03-28 DIAGNOSIS — R30.0: Primary | ICD-10-CM

## 2018-03-28 DIAGNOSIS — N13.30: ICD-10-CM

## 2018-04-17 ENCOUNTER — HOSPITAL ENCOUNTER (OUTPATIENT)
Dept: HOSPITAL 45 - C.LAB | Age: 83
Discharge: HOME | End: 2018-04-17
Attending: UROLOGY
Payer: COMMERCIAL

## 2018-04-17 DIAGNOSIS — R30.0: ICD-10-CM

## 2018-04-17 DIAGNOSIS — N13.30: Primary | ICD-10-CM

## 2018-04-17 LAB
ALBUMIN SERPL-MCNC: 3.9 GM/DL (ref 3.4–5)
ALP SERPL-CCNC: 88 U/L (ref 45–117)
ALT SERPL-CCNC: 42 U/L (ref 12–78)
AST SERPL-CCNC: 29 U/L (ref 15–37)
BUN SERPL-MCNC: 23 MG/DL (ref 7–18)
CALCIUM SERPL-MCNC: 9.5 MG/DL (ref 8.5–10.1)
CO2 SERPL-SCNC: 34 MMOL/L (ref 21–32)
CREAT SERPL-MCNC: 0.71 MG/DL (ref 0.6–1.2)
GLUCOSE SERPL-MCNC: 88 MG/DL (ref 70–99)
POTASSIUM SERPL-SCNC: 4 MMOL/L (ref 3.5–5.1)
PROT SERPL-MCNC: 7.6 GM/DL (ref 6.4–8.2)
SODIUM SERPL-SCNC: 137 MMOL/L (ref 136–145)

## 2018-04-26 ENCOUNTER — HOSPITAL ENCOUNTER (OUTPATIENT)
Dept: HOSPITAL 45 - C.NUCL | Age: 83
Discharge: HOME | End: 2018-04-26
Attending: UROLOGY
Payer: COMMERCIAL

## 2018-04-26 DIAGNOSIS — N13.30: ICD-10-CM

## 2018-04-26 DIAGNOSIS — R30.0: Primary | ICD-10-CM

## 2018-04-26 NOTE — DIAGNOSTIC IMAGING REPORT
NUCLEAR DIURETIC RENAL SCAN



CLINICAL HISTORY: Right-sided hydronephrosis. Dysuria. 



COMPARISON STUDY: Abdominal CT dated 3/9/2018.



TECHNIQUE: A nuclear diuretic renal scan is performed following the IV

administration of 8.5 mCi technetium 99m radiolabeled MAG3. Posterior flow

images were acquired at one frame every two seconds for a total 30 frames.

Posterior static images were acquired every 5 minutes for a total of 40 minutes.

IV Lasix was administered at 20 minutes. Renal curves were calculated.



FINDINGS:



On the flow images, there is normal and symmetric accumulation of activity in

both kidneys.



On the delayed images, there is pooling of tracer within the right renal pelvis

with evidence of right-sided hydronephrosis. There is delayed excretion of

contrast from the right kidney. The right kidney responds appropriately to Lasix

which was administered at 20 minutes. There was no left-sided hydronephrosis

identified.



Time to peak on the right measures 16 minutes and time to peak on the left

measures 4 minutes 

The T 1/2 post Lasix on the right measures 26.3 min and the T 1/2 post-Lasix on

the left measures 26.9 min.



Split function measures 50% on the right and  50% on the left.





IMPRESSION: 



1. 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

obstruction.



2. The right kidney responds normally to Lasix.



3. Split function measures 50% on the right and 50% on the left.







Electronically signed by:  Lacho Goodrich M.D.

4/26/2018 11:57 AM



Dictated Date/Time:  4/26/2018 11:50 AM